# Patient Record
Sex: FEMALE | Race: WHITE | NOT HISPANIC OR LATINO | Employment: FULL TIME
[De-identification: names, ages, dates, MRNs, and addresses within clinical notes are randomized per-mention and may not be internally consistent; named-entity substitution may affect disease eponyms.]

---

## 2021-07-30 ENCOUNTER — TRANSCRIBE ORDERS (OUTPATIENT)
Dept: OTHER | Age: 66
End: 2021-07-30

## 2021-07-30 DIAGNOSIS — D37.3 LOW GRADE MUCINOUS NEOPLASM OF APPENDIX: Primary | ICD-10-CM

## 2021-08-03 ENCOUNTER — TELEPHONE (OUTPATIENT)
Dept: ONCOLOGY | Facility: CLINIC | Age: 66
End: 2021-08-03

## 2021-08-03 NOTE — PROGRESS NOTES
The patient wants to discuss the date with her . She is still considering 8/12, so would like us to keep it on hold for a couple days.

## 2021-08-12 ENCOUNTER — VIRTUAL VISIT (OUTPATIENT)
Dept: RADIATION THERAPY | Facility: OUTPATIENT CENTER | Age: 66
End: 2021-08-12
Payer: COMMERCIAL

## 2021-08-12 DIAGNOSIS — C18.1 MALIGNANT NEOPLASM OF APPENDIX (H): Primary | ICD-10-CM

## 2021-08-12 PROBLEM — A04.72 C. DIFFICILE COLITIS: Status: ACTIVE | Noted: 2021-07-26

## 2021-08-12 PROBLEM — I10 WHITE COAT SYNDROME WITH HYPERTENSION: Status: ACTIVE | Noted: 2021-08-12

## 2021-08-12 PROBLEM — K35.32 PERFORATED APPENDICITIS: Status: ACTIVE | Noted: 2021-07-09

## 2021-08-12 PROBLEM — E78.5 HYPERLIPIDEMIA: Status: ACTIVE | Noted: 2021-08-12

## 2021-08-12 PROBLEM — E11.9 INSULIN DEPENDENT TYPE 2 DIABETES MELLITUS, CONTROLLED (H): Status: ACTIVE | Noted: 2021-08-12

## 2021-08-12 PROBLEM — Z79.4 INSULIN DEPENDENT TYPE 2 DIABETES MELLITUS, CONTROLLED (H): Status: ACTIVE | Noted: 2021-08-12

## 2021-08-12 RX ORDER — FLURBIPROFEN SODIUM 0.3 MG/ML
1 SOLUTION/ DROPS OPHTHALMIC DAILY
COMMUNITY
Start: 2020-12-22

## 2021-08-12 RX ORDER — BLOOD-GLUCOSE METER
1 EACH MISCELLANEOUS 2 TIMES DAILY
COMMUNITY
Start: 2021-03-18 | End: 2022-03-18

## 2021-08-12 RX ORDER — BLOOD SUGAR DIAGNOSTIC
1 STRIP MISCELLANEOUS 2 TIMES DAILY
COMMUNITY
Start: 2021-03-18 | End: 2022-03-18

## 2021-08-12 RX ORDER — ASCORBIC ACID 1000 MG
1 TABLET ORAL DAILY
COMMUNITY

## 2021-08-12 RX ORDER — ATORVASTATIN CALCIUM 20 MG/1
20 TABLET, FILM COATED ORAL DAILY
COMMUNITY
Start: 2021-06-22

## 2021-08-12 NOTE — LETTER
8/12/2021         RE: Marva Patel  [address] [place] [id number]        Dear Colleague,    Thank you for referring your patient, Marva Patel, to the [facility]. Please see a copy of my visit note below.    VIRTUAL VISIT    HISTORY OF PRESENT ILLNESS:  Today I had a planned video consult with Marva Patel but this turned into a phone visit because the video was not working.  I was asked to see Marva Jcarlosmasoudkaushik at the request of Dr. 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  In May, the patient presented with signs and symptoms and CT findings consistent with perforated appendicitis.  She was treated with antibiotics and had a percutaneous drain placed in the right lower quadrant.  She improved and underwent a colonoscopy and laparoscopic appendectomy on 07/09 by Dr. Escobar.  There is no mention in the operative report of intraperitoneal mucin or peritoneal nodules.  The appendectomy went relatively uneventfully, but she did have C difficile colitis.  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  There was perforation present.  There was no tumor at the margin.  She has recovered well from that surgery.    PAST MEDICAL HISTORY:  Significant for diabetes.    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    PLAN:  I have talked to the patient about this finding. Because of the perforation, I would recommend getting a CT scan at 6 months and at 1 year.  If these CT scans are negative, then we can stop imaging on her.  If she develops more symptoms, then we would reimage her in the future.  She would like to have her CT scans done in [place] and I think that is reasonable.  If there are any abnormalities on her imaging, then I would be glad to evaluate her again.    Total time of the phone visit was 10 minutes.  Total  overall time was 20 minutes.      Ralph Shah MD    cc:  Jarrod Escobar MD  [address]  [phone]

## 2021-08-12 NOTE — NURSING NOTE
Marva is a 66 year old who is being evaluated via a billable telephone visit.      What phone number would you like to be contacted at? [phone]  How would you like to obtain your AVS? Mail a copy  Phone call duration: 5 minutes    Oncology Rooming Note    August 12, 2021 10:52 AM   Marva Patel is a 66 year old female who presents for:    Chief Complaint   Patient presents with     Oncology Clinic Visit     New Surgical Oncology Consult with Dr. Shah- low grade mucinous neoplasm of appendix      Initial Vitals: There were no vitals taken for this visit. There is no height or weight on file to calculate BMI. There is no height or weight on file to calculate BSA.  Data Unavailable Comment: Data Unavailable   No LMP recorded.  Allergies reviewed: Yes  Medications reviewed: Yes    Medications: Medication refills not needed today.  Pharmacy name entered into EPIC: Data Unavailable    Clinical concerns: New surgical oncology consult for low grade mucinous neoplasm of appendix Dr. Shah was notified.      Kamila Guzman RN

## 2021-08-12 NOTE — PROGRESS NOTES
VIRTUAL VISIT    HISTORY OF PRESENT ILLNESS:  Today I had a planned video consult with Marva Patel but this turned into a phone visit because the video was not working.  I was asked to see Marva Jcarlossalvatore at the request of Dr. 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  In May, the patient presented with signs and symptoms and CT findings consistent with perforated appendicitis.  She was treated with antibiotics and had a percutaneous drain placed in the right lower quadrant.  She improved and underwent a colonoscopy and laparoscopic appendectomy on 07/09 by Dr. Escobar.  There is no mention in the operative report of intraperitoneal mucin or peritoneal nodules.  The appendectomy went relatively uneventfully, but she did have C difficile colitis.  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  There was perforation present.  There was no tumor at the margin.  She has recovered well from that surgery.    PAST MEDICAL HISTORY:  Significant for diabetes.    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    PLAN:  I have talked to the patient about this finding. Because of the perforation, I would recommend getting a CT scan at 6 months and at 1 year.  If these CT scans are negative, then we can stop imaging on her.  If she develops more symptoms, then we would reimage her in the future.  She would like to have her CT scans done in [place] and I think that is reasonable.  If there are any abnormalities on her imaging, then I would be glad to evaluate her again.    Total time of the phone visit was 10 minutes.  Total overall time was 20 minutes.      Ralph Shah MD    cc:  Jarrod Escobar MD  [address]  [phone]